# Patient Record
Sex: MALE | Race: ASIAN | NOT HISPANIC OR LATINO
[De-identification: names, ages, dates, MRNs, and addresses within clinical notes are randomized per-mention and may not be internally consistent; named-entity substitution may affect disease eponyms.]

---

## 2022-12-12 ENCOUNTER — APPOINTMENT (OUTPATIENT)
Dept: UROLOGY | Facility: CLINIC | Age: 72
End: 2022-12-12

## 2022-12-12 DIAGNOSIS — Z78.9 OTHER SPECIFIED HEALTH STATUS: ICD-10-CM

## 2022-12-12 PROCEDURE — 99204 OFFICE O/P NEW MOD 45 MIN: CPT

## 2022-12-12 NOTE — LETTER BODY
[FreeTextEntry1] : Torito Rodriguez MD\par [address] [facility]\[place] [address]\par [phone]\par \par Dear Dr. Rodriguez, \par \par Reason for visit: Abnormal imaging. Possible bilateral stones.\par \par This is a 72 year old male with abnormal abdominal imaging. Patient underwent an ultrasound, which demonstrated possible bilateral stones. Patient denies any flank pain, hematuria, or urinary difficulties. Patient denies any urinary incontinence. The patient denies any aggravating or relieving factors. The patient denies any interference of function. The patient is entirely asymptomatic. All other review of systems are negative. He has no cancer in his family medical history. He has no previous surgical history. Past medical history, family history and social history were inquired and were noncontributory to current condition. The patient does not use tobacco or drink alcohol. Medications and allergies were reviewed. He has no known allergies to medication. \par \par On examination, the patient is a healthy-appearing gentleman in no acute distress. He is alert and oriented follows commands. He has normal mood and affect. He is normocephalic. Neck is supple. Respirations are unlabored. His abdomen is soft and nontender. Bladder is nonpalpable. No CVA tenderness. Neurologically he is grossly intact. No peripheral edema. Skin without gross abnormality. He has normal male external genitalia. Normal meatus. Bilateral testes are descended intrascrotally and normal to palpation. On rectal examination, there is normal sphincter tone. The prostate is clinically benign without focal induration or nodularity.\par \par I personally reviewed ultrasound images with the patient today and images demonstrated a left 1.7 cm ureteral stone with hydronephrosis. Patient also has a right kidney stone. \par \par Assessment: Abnormal urinary imaging. RIght kidney stone. Left ureteral stone. Possible left hydronephrosis. Bilateral stones.\par \par I counseled the patient. I recommend he obtained CT scan to evaluate for hydronephrosis.  I counseled the patient regarding the procedure. The risks and benefits were discussed. Alternatives were given. I answered the patient questions. The patient will take the necessary preparations for the procedure. The patient will follow-up as directed and will contact me with any questions or concerns. \par \par Plan: CT scan. Follow up as directed.

## 2022-12-13 ENCOUNTER — RESULT REVIEW (OUTPATIENT)
Age: 72
End: 2022-12-13

## 2022-12-13 LAB
ANION GAP SERPL CALC-SCNC: 13 MMOL/L
BASOPHILS # BLD AUTO: 0.05 K/UL
BASOPHILS NFR BLD AUTO: 0.9 %
BUN SERPL-MCNC: 15 MG/DL
CALCIUM SERPL-MCNC: 9.3 MG/DL
CHLORIDE SERPL-SCNC: 101 MMOL/L
CO2 SERPL-SCNC: 26 MMOL/L
CREAT SERPL-MCNC: 1.05 MG/DL
EGFR: 75 ML/MIN/1.73M2
EOSINOPHIL # BLD AUTO: 0.34 K/UL
EOSINOPHIL NFR BLD AUTO: 6 %
GLUCOSE SERPL-MCNC: 139 MG/DL
HCT VFR BLD CALC: 47.4 %
HGB BLD-MCNC: 15.4 G/DL
IMM GRANULOCYTES NFR BLD AUTO: 0.5 %
LYMPHOCYTES # BLD AUTO: 1.74 K/UL
LYMPHOCYTES NFR BLD AUTO: 30.9 %
MAN DIFF?: NORMAL
MCHC RBC-ENTMCNC: 30.7 PG
MCHC RBC-ENTMCNC: 32.5 GM/DL
MCV RBC AUTO: 94.4 FL
MONOCYTES # BLD AUTO: 0.39 K/UL
MONOCYTES NFR BLD AUTO: 6.9 %
NEUTROPHILS # BLD AUTO: 3.09 K/UL
NEUTROPHILS NFR BLD AUTO: 54.8 %
PLATELET # BLD AUTO: 316 K/UL
POTASSIUM SERPL-SCNC: 4.2 MMOL/L
PSA FREE FLD-MCNC: 13 %
PSA FREE SERPL-MCNC: 0.27 NG/ML
PSA SERPL-MCNC: 2.1 NG/ML
RBC # BLD: 5.02 M/UL
RBC # FLD: 13.1 %
SODIUM SERPL-SCNC: 141 MMOL/L
WBC # FLD AUTO: 5.64 K/UL

## 2022-12-14 ENCOUNTER — OUTPATIENT (OUTPATIENT)
Dept: OUTPATIENT SERVICES | Facility: HOSPITAL | Age: 72
LOS: 1 days | End: 2022-12-14
Payer: COMMERCIAL

## 2022-12-14 ENCOUNTER — APPOINTMENT (OUTPATIENT)
Dept: CT IMAGING | Facility: CLINIC | Age: 72
End: 2022-12-14

## 2022-12-14 DIAGNOSIS — R31.0 GROSS HEMATURIA: ICD-10-CM

## 2022-12-14 DIAGNOSIS — N20.1 CALCULUS OF URETER: ICD-10-CM

## 2022-12-14 PROCEDURE — 74176 CT ABD & PELVIS W/O CONTRAST: CPT | Mod: 26

## 2022-12-19 ENCOUNTER — APPOINTMENT (OUTPATIENT)
Dept: UROLOGY | Facility: CLINIC | Age: 72
End: 2022-12-19

## 2022-12-19 PROCEDURE — 99214 OFFICE O/P EST MOD 30 MIN: CPT

## 2022-12-19 NOTE — LETTER BODY
[FreeTextEntry1] : Torito Rodriguez MD\par [address] [facility]\[place] [address]\par [phone]\par \par Dear Dr. Rodriguez, \par \par Reason for visit: Large left ureteral stone.\par \par This is a 72 year old male with abnormal abdominal imaging. Patient previously underwent an ultrasound, which demonstrated possible bilateral stones. Patient denies any flank pain, hematuria, or urinary difficulties. Patient denies any urinary incontinence. The patient denies any aggravating or relieving factors. The patient denies any interference of function. The patient is entirely asymptomatic. All other review of systems are negative. He has no cancer in his family medical history. He has no previous surgical history. Past medical history, family history and social history were inquired and were noncontributory to current condition. The patient does not use tobacco or drink alcohol. Medications and allergies were reviewed. He has no known allergies to medication. \par \par On examination, the patient is a healthy-appearing gentleman in no acute distress. He is alert and oriented follows commands. He has normal mood and affect. He is normocephalic. Neck is supple. Respirations are unlabored. His abdomen is soft and nontender. Bladder is nonpalpable. No CVA tenderness. Neurologically he is grossly intact. No peripheral edema. Skin without gross abnormality. He has normal male external genitalia. Normal meatus. Bilateral testes are descended intrascrotally and normal to palpation. On rectal examination, there is normal sphincter tone. The prostate is clinically benign without focal induration or nodularity.\par \par Assessment: Abnormal urinary imaging. RIght kidney stone. Left ureteral stone. Possible left hydronephrosis. Bilateral stones.\par \par I counseled the patient. I recommend he obtain left uteroscopy for his left ureteral stone. I counseled the patient regarding the procedure. The risks and benefits were discussed. Alternatives were given. I answered the patient questions. The patient will take the necessary preparations for the procedure. The patient will follow-up as directed and will contact me with any questions or concerns. \par \par Plan: Left uteroscopy. BMP. Urine culture. Follow up as directed.

## 2022-12-19 NOTE — ADDENDUM
[FreeTextEntry1] : Entered by Rylie Lipscomb, acting as scribe for Dr. Onel Bowie.\par The documentation recorded by the scribe accurately reflects the service I personally performed and the decisions made by me.

## 2022-12-20 LAB — BACTERIA UR CULT: NORMAL

## 2022-12-21 ENCOUNTER — OUTPATIENT (OUTPATIENT)
Dept: OUTPATIENT SERVICES | Facility: HOSPITAL | Age: 72
LOS: 1 days | End: 2022-12-21
Payer: COMMERCIAL

## 2022-12-21 VITALS
HEIGHT: 67 IN | SYSTOLIC BLOOD PRESSURE: 151 MMHG | WEIGHT: 147.93 LBS | DIASTOLIC BLOOD PRESSURE: 84 MMHG | HEART RATE: 77 BPM | OXYGEN SATURATION: 99 % | RESPIRATION RATE: 14 BRPM | TEMPERATURE: 98 F

## 2022-12-21 DIAGNOSIS — Z00.00 ENCOUNTER FOR GENERAL ADULT MEDICAL EXAMINATION WITHOUT ABNORMAL FINDINGS: ICD-10-CM

## 2022-12-21 DIAGNOSIS — R31.0 GROSS HEMATURIA: ICD-10-CM

## 2022-12-21 DIAGNOSIS — N20.1 CALCULUS OF URETER: ICD-10-CM

## 2022-12-21 DIAGNOSIS — Z01.818 ENCOUNTER FOR OTHER PREPROCEDURAL EXAMINATION: ICD-10-CM

## 2022-12-21 DIAGNOSIS — Z98.890 OTHER SPECIFIED POSTPROCEDURAL STATES: Chronic | ICD-10-CM

## 2022-12-21 LAB
ANION GAP SERPL CALC-SCNC: 13 MMOL/L — SIGNIFICANT CHANGE UP (ref 5–17)
BUN SERPL-MCNC: 17 MG/DL — SIGNIFICANT CHANGE UP (ref 7–23)
CALCIUM SERPL-MCNC: 9.3 MG/DL — SIGNIFICANT CHANGE UP (ref 8.4–10.5)
CHLORIDE SERPL-SCNC: 101 MMOL/L — SIGNIFICANT CHANGE UP (ref 96–108)
CO2 SERPL-SCNC: 27 MMOL/L — SIGNIFICANT CHANGE UP (ref 22–31)
CREAT SERPL-MCNC: 1.07 MG/DL — SIGNIFICANT CHANGE UP (ref 0.5–1.3)
EGFR: 73 ML/MIN/1.73M2 — SIGNIFICANT CHANGE UP
GLUCOSE SERPL-MCNC: 96 MG/DL — SIGNIFICANT CHANGE UP (ref 70–99)
HCT VFR BLD CALC: 48.6 % — SIGNIFICANT CHANGE UP (ref 39–50)
HGB BLD-MCNC: 15.8 G/DL — SIGNIFICANT CHANGE UP (ref 13–17)
MCHC RBC-ENTMCNC: 30.6 PG — SIGNIFICANT CHANGE UP (ref 27–34)
MCHC RBC-ENTMCNC: 32.5 GM/DL — SIGNIFICANT CHANGE UP (ref 32–36)
MCV RBC AUTO: 94.2 FL — SIGNIFICANT CHANGE UP (ref 80–100)
NRBC # BLD: 0 /100 WBCS — SIGNIFICANT CHANGE UP (ref 0–0)
PLATELET # BLD AUTO: 308 K/UL — SIGNIFICANT CHANGE UP (ref 150–400)
POTASSIUM SERPL-MCNC: 4.3 MMOL/L — SIGNIFICANT CHANGE UP (ref 3.5–5.3)
POTASSIUM SERPL-SCNC: 4.3 MMOL/L — SIGNIFICANT CHANGE UP (ref 3.5–5.3)
RBC # BLD: 5.16 M/UL — SIGNIFICANT CHANGE UP (ref 4.2–5.8)
RBC # FLD: 12.7 % — SIGNIFICANT CHANGE UP (ref 10.3–14.5)
SODIUM SERPL-SCNC: 141 MMOL/L — SIGNIFICANT CHANGE UP (ref 135–145)
WBC # BLD: 5.97 K/UL — SIGNIFICANT CHANGE UP (ref 3.8–10.5)
WBC # FLD AUTO: 5.97 K/UL — SIGNIFICANT CHANGE UP (ref 3.8–10.5)

## 2022-12-21 PROCEDURE — 80048 BASIC METABOLIC PNL TOTAL CA: CPT

## 2022-12-21 PROCEDURE — G0463: CPT

## 2022-12-21 PROCEDURE — 36415 COLL VENOUS BLD VENIPUNCTURE: CPT

## 2022-12-21 PROCEDURE — 85027 COMPLETE CBC AUTOMATED: CPT

## 2022-12-21 NOTE — H&P PST ADULT - HISTORY OF PRESENT ILLNESS
DASI-   Denies COVID infection in past 90 days  COVID swab scheduled at [facility] 12/31/22 73 y/o male with no significant PMH/PSH- right kidney stone extraction (approximately 1970) presents today for presurgical testing.  He is scheduled for left ureteroscopy; laser lithotripsy; stone extraction; stent placement on 1/4/23 with Onel Tee.  He denies pain, infection, urinary frequency or hesitancy.  He states that he has had blood in his urine.  CT renal stone hunt 12/14/22 showed numerous left sided kidney stones and mild hydronephrosis; also right kidney stones without hydronephrosis.  He denies fever, chills, malaise, fatigue, n/v, diarrhea, abdominal pain, sore throat, headaches, nasal congestion, rhinnorhea, ear pain, cough, SOB, chest pain, palpitations, and change in taste/smell.       DASI- 7.99 (denies chest pain/SOB)  Denies COVID infection in past 90 days  COVID swab scheduled at [facility] 12/31/22  Advised bring case for glasses DOS  Advised bring denture cup DOS; he is aware must remove dentures prior to OR

## 2022-12-21 NOTE — H&P PST ADULT - PROBLEM SELECTOR PLAN 2
- scheduled for left ureteroscopy; laser lithotripsy; stone extraction; stent placement on 1/4/23 with Onel Tee  -CBC, BMP today in PST  -Urine Culture (Dr. Bowie 12/19/22)  -COVID swab scheduled for  [place]- 12/31/22  -Preop instructions provided; Patient stated understanding using teach back method; copy of written instructions provided  -Adequate time provided for questions and answers   -No medications by mouth am DOS, prior to leaving home; not presently on medications  -Antibiotics ordered DOS per protocol

## 2022-12-21 NOTE — H&P PST ADULT - ATTENDING COMMENTS
Patient with left renal and ureteral calculi. Patient wishes to proceed with left ureteroscopy, laser lithotripsy, stone extraction, stent placement. Informed consent was obtained. Alternatives were given. Risks including but not limited to bleeding, infection, risk of anesthesia, pain, failure to cure, negative ureteroscopy, stone migration, need for future procedure, and injury to surrounding structures were discussed. Patient wishes to proceed with procedure.

## 2022-12-21 NOTE — H&P PST ADULT - NEGATIVE GENERAL GENITOURINARY SYMPTOMS
no renal colic/no flank pain L/no flank pain R/no bladder infections/no incontinence/no dysuria/no urinary hesitancy/normal urinary frequency/no nocturia

## 2022-12-21 NOTE — H&P PST ADULT - NSICDXPASTSURGICALHX_GEN_ALL_CORE_FT
Christian PAST SURGICAL HISTORY:  H/O lithotripsy     History of ear, nose, and throat (ENT) surgery

## 2022-12-23 PROBLEM — N20.0 CALCULUS OF KIDNEY: Chronic | Status: ACTIVE | Noted: 2022-12-21

## 2022-12-31 LAB — SARS-COV-2 N GENE NPH QL NAA+PROBE: NOT DETECTED

## 2023-01-03 ENCOUNTER — TRANSCRIPTION ENCOUNTER (OUTPATIENT)
Age: 73
End: 2023-01-03

## 2023-01-04 ENCOUNTER — APPOINTMENT (OUTPATIENT)
Dept: UROLOGY | Facility: HOSPITAL | Age: 73
End: 2023-01-04

## 2023-01-04 ENCOUNTER — TRANSCRIPTION ENCOUNTER (OUTPATIENT)
Age: 73
End: 2023-01-04

## 2023-01-04 ENCOUNTER — RESULT REVIEW (OUTPATIENT)
Age: 73
End: 2023-01-04

## 2023-01-04 ENCOUNTER — OUTPATIENT (OUTPATIENT)
Dept: OUTPATIENT SERVICES | Facility: HOSPITAL | Age: 73
LOS: 1 days | End: 2023-01-04
Payer: COMMERCIAL

## 2023-01-04 VITALS
WEIGHT: 147.93 LBS | HEIGHT: 67 IN | RESPIRATION RATE: 17 BRPM | HEART RATE: 76 BPM | SYSTOLIC BLOOD PRESSURE: 155 MMHG | OXYGEN SATURATION: 97 % | TEMPERATURE: 98 F | DIASTOLIC BLOOD PRESSURE: 78 MMHG

## 2023-01-04 VITALS
OXYGEN SATURATION: 98 % | HEART RATE: 73 BPM | DIASTOLIC BLOOD PRESSURE: 85 MMHG | SYSTOLIC BLOOD PRESSURE: 164 MMHG | RESPIRATION RATE: 16 BRPM

## 2023-01-04 DIAGNOSIS — Z98.890 OTHER SPECIFIED POSTPROCEDURAL STATES: Chronic | ICD-10-CM

## 2023-01-04 DIAGNOSIS — R31.0 GROSS HEMATURIA: ICD-10-CM

## 2023-01-04 DIAGNOSIS — N20.1 CALCULUS OF URETER: ICD-10-CM

## 2023-01-04 DIAGNOSIS — Z00.00 ENCOUNTER FOR GENERAL ADULT MEDICAL EXAMINATION WITHOUT ABNORMAL FINDINGS: ICD-10-CM

## 2023-01-04 PROCEDURE — 74420 UROGRAPHY RTRGR +-KUB: CPT | Mod: 26

## 2023-01-04 PROCEDURE — 52356 CYSTO/URETERO W/LITHOTRIPSY: CPT | Mod: 22,LT

## 2023-01-04 PROCEDURE — 88300 SURGICAL PATH GROSS: CPT | Mod: 26

## 2023-01-04 PROCEDURE — 76000 FLUOROSCOPY <1 HR PHYS/QHP: CPT

## 2023-01-04 PROCEDURE — 74176 CT ABD & PELVIS W/O CONTRAST: CPT

## 2023-01-04 DEVICE — STENT URET INLAY OPTIMA 6FRX26CM: Type: IMPLANTABLE DEVICE | Site: LEFT | Status: FUNCTIONAL

## 2023-01-04 DEVICE — URETERAL CATH OPEN END 5FR 70CM: Type: IMPLANTABLE DEVICE | Site: LEFT | Status: FUNCTIONAL

## 2023-01-04 DEVICE — LASER FIBER SOLTIVE 200 BALL TIP: Type: IMPLANTABLE DEVICE | Site: LEFT | Status: FUNCTIONAL

## 2023-01-04 DEVICE — URETERAL CATH DUAL LUMEN 10FR 54CM: Type: IMPLANTABLE DEVICE | Site: LEFT | Status: FUNCTIONAL

## 2023-01-04 DEVICE — URETERAL SHEATH NAVIGATOR HD 11/13FR X 46CM: Type: IMPLANTABLE DEVICE | Site: LEFT | Status: FUNCTIONAL

## 2023-01-04 DEVICE — GUIDEWIRE SENSOR DUAL-FLEX NITINOL STRAIGHT .035" X 150CM: Type: IMPLANTABLE DEVICE | Site: LEFT | Status: FUNCTIONAL

## 2023-01-04 DEVICE — STONE BASKET ZEROTIP NITINOL 4-WIRE 1.9FR 120CM X 12MM: Type: IMPLANTABLE DEVICE | Site: LEFT | Status: FUNCTIONAL

## 2023-01-04 RX ORDER — SODIUM CHLORIDE 9 MG/ML
1000 INJECTION, SOLUTION INTRAVENOUS
Refills: 0 | Status: DISCONTINUED | OUTPATIENT
Start: 2023-01-04 | End: 2023-01-04

## 2023-01-04 RX ORDER — CEFAZOLIN SODIUM 1 G
2000 VIAL (EA) INJECTION ONCE
Refills: 0 | Status: COMPLETED | OUTPATIENT
Start: 2023-01-04 | End: 2023-01-04

## 2023-01-04 RX ORDER — SODIUM CHLORIDE 9 MG/ML
3 INJECTION INTRAMUSCULAR; INTRAVENOUS; SUBCUTANEOUS EVERY 8 HOURS
Refills: 0 | Status: DISCONTINUED | OUTPATIENT
Start: 2023-01-04 | End: 2023-01-04

## 2023-01-04 RX ORDER — FINASTERIDE 5 MG/1
1 TABLET, FILM COATED ORAL
Qty: 30 | Refills: 0
Start: 2023-01-04 | End: 2023-02-02

## 2023-01-04 RX ORDER — TAMSULOSIN HYDROCHLORIDE 0.4 MG/1
1 CAPSULE ORAL
Qty: 30 | Refills: 0
Start: 2023-01-04 | End: 2023-02-02

## 2023-01-04 RX ORDER — CEPHALEXIN 500 MG
1 CAPSULE ORAL
Qty: 6 | Refills: 0
Start: 2023-01-04 | End: 2023-01-06

## 2023-01-04 RX ORDER — LIDOCAINE HCL 20 MG/ML
0.2 VIAL (ML) INJECTION ONCE
Refills: 0 | Status: DISCONTINUED | OUTPATIENT
Start: 2023-01-04 | End: 2023-01-04

## 2023-01-04 RX ORDER — HYDROMORPHONE HYDROCHLORIDE 2 MG/ML
0.5 INJECTION INTRAMUSCULAR; INTRAVENOUS; SUBCUTANEOUS
Refills: 0 | Status: DISCONTINUED | OUTPATIENT
Start: 2023-01-04 | End: 2023-01-04

## 2023-01-04 RX ORDER — SODIUM CHLORIDE 9 MG/ML
1000 INJECTION, SOLUTION INTRAVENOUS
Refills: 0 | Status: DISCONTINUED | OUTPATIENT
Start: 2023-01-04 | End: 2023-01-18

## 2023-01-04 NOTE — ASU DISCHARGE PLAN (ADULT/PEDIATRIC) - NS MD DC FALL RISK RISK
For information on Fall & Injury Prevention, visit: [email or web address] OR  [email or web address] OR  https://www.cdc.gov/steadi/patient.html

## 2023-01-04 NOTE — ASU DISCHARGE PLAN (ADULT/PEDIATRIC) - ASU DC SPECIAL INSTRUCTIONSFT
Call the office if you have fever greater than 101, difficulty urinating, pain not relieved with pain medication, nausea/vomiting.    Follow up with Dr. Bowie in 2 weeks for KUB  May take over the counter pain medication as needed for pain Call the office if you have fever greater than 101, difficulty urinating, pain not relieved with pain medication, nausea/vomiting.    follow up on friday 1/6 for fowler removal.  Follow up with Dr. Bowie in 2 weeks for KUB  May take over the counter pain medication as needed for pain

## 2023-01-04 NOTE — PATIENT PROFILE ADULT - PATIENT'S SEXUAL ORIENTATION
INFECTION - PEDIATRIC    • Absence of infection during hospitalization Progressing        PAIN - PEDIATRIC    • Verbalizes/displays adequate comfort level or patient's stated pain goal Progressing        Patient/Family Goals    • Patient/Family Long Term G
Pt's VSS over night. Afebrile. Pt on room air, NAD noted. Lung sounds clear and equal.  No WOB noted. Minimal/scant crepitus noted to left side. Left chest/abd drsg CDI. NSR on monitor. Strong pulses and perfusion.   Pt blaire reg diet with good PO [place]
Heterosexual

## 2023-01-04 NOTE — ASU DISCHARGE PLAN (ADULT/PEDIATRIC) - BATHING
No change Provider Procedure Text (A): After obtaining clear surgical margins the defect was repaired by another provider.

## 2023-01-04 NOTE — PROCEDURE NOTE - ADDITIONAL PROCEDURE DETAILS
Patient uncomfortable, unable to void. Retaining about 900cc. Called to place fowler for retention. 18F coude placed using sterile technique. 700cc clear colored urine drained. Patient tolerated procedure well. Patient to go home with leg to fowler bag and follow up with Dr. Bowie in 2 days for removal.

## 2023-01-05 ENCOUNTER — APPOINTMENT (OUTPATIENT)
Dept: UROLOGY | Facility: CLINIC | Age: 73
End: 2023-01-05

## 2023-01-05 PROCEDURE — C1766: CPT

## 2023-01-05 PROCEDURE — C1889: CPT

## 2023-01-05 PROCEDURE — C1769: CPT

## 2023-01-05 PROCEDURE — C1758: CPT

## 2023-01-05 PROCEDURE — 88300 SURGICAL PATH GROSS: CPT

## 2023-01-05 PROCEDURE — C2617: CPT

## 2023-01-05 PROCEDURE — 82365 CALCULUS SPECTROSCOPY: CPT

## 2023-01-05 PROCEDURE — 52356 CYSTO/URETERO W/LITHOTRIPSY: CPT | Mod: LT

## 2023-01-05 PROCEDURE — 76000 FLUOROSCOPY <1 HR PHYS/QHP: CPT

## 2023-01-06 ENCOUNTER — APPOINTMENT (OUTPATIENT)
Dept: UROLOGY | Facility: CLINIC | Age: 73
End: 2023-01-06
Payer: MEDICARE

## 2023-01-06 VITALS
TEMPERATURE: 97.9 F | RESPIRATION RATE: 16 BRPM | OXYGEN SATURATION: 96 % | SYSTOLIC BLOOD PRESSURE: 120 MMHG | DIASTOLIC BLOOD PRESSURE: 69 MMHG | HEART RATE: 107 BPM

## 2023-01-06 PROCEDURE — 51798 US URINE CAPACITY MEASURE: CPT

## 2023-01-06 PROCEDURE — A4216: CPT | Mod: NC

## 2023-01-06 PROCEDURE — 51700 IRRIGATION OF BLADDER: CPT

## 2023-01-06 PROCEDURE — 99213 OFFICE O/P EST LOW 20 MIN: CPT | Mod: 25

## 2023-01-06 NOTE — LETTER BODY
[FreeTextEntry1] : Torito Rodriguez MD\par [address] [facility]\[place] [address]\par [phone]\par \par Dear Dr. Rodriguez, \par \par Reason for visit: RIght kidney stone. Left ureteral stone, s/p left ureteroscopy. Bilateral stones.\par \par This is a 72 year old male with abnormal abdominal imaging. Patient previously underwent an ultrasound, which demonstrated possible bilateral stones. The patient returns today for voiding trial. Since he was last seen, the patient underwent left ureteroscopy without any difficulties. Patient denies any flank pain, hematuria, or urinary difficulties. Patient denies any urinary incontinence. The patient denies any aggravating or relieving factors. The patient denies any interference of function. The patient is entirely asymptomatic. All other review of systems are negative. He has no cancer in his family medical history. He has no previous surgical history. Past medical history, family history and social history were inquired and were noncontributory to current condition. The patient does not use tobacco or drink alcohol. Medications and allergies were reviewed. He has no known allergies to medication. \par \par On examination, the patient is a healthy-appearing gentleman in no acute distress. He is alert and oriented follows commands. He has normal mood and affect. He is normocephalic. Neck is supple. Respirations are unlabored. His abdomen is soft and nontender. Bladder is nonpalpable. No CVA tenderness. Neurologically he is grossly intact. No peripheral edema. Skin without gross abnormality. He has normal male external genitalia. Normal meatus. Bilateral testes are descended intrascrotally and normal to palpation. On rectal examination, there is normal sphincter tone. The prostate is clinically benign without focal induration or nodularity.\par \par Patient was given a voiding trial today. The patient's bladder was filled with water. Patient was able to void spontaneously.\par \par Post-void residual on bladder scan today was 0 cc.\par \par Assessment: Abnormal urinary imaging. RIght kidney stone. Left ureteral stone. Possible left hydronephrosis. Bilateral stones.\par \par I counseled the patient. In terms of his left ureteral stone and left hydronephrosis, the patient passed his voiding trial today. I recommended he follow up in 2 weeks for stent removal. Risks and alternatives were discussed. I answered the patient questions. The patient will follow-up as directed and will contact me with any questions or concerns. Thank you for the opportunity to participate in the care of this patient, I will keep you updated on his progress. \par \par Plan: Follow up in 2 weeks for left stent removal.

## 2023-01-06 NOTE — ADDENDUM
[FreeTextEntry1] : Entered by ANN PIEDRA, acting as scribe for Dr. Onel Bowie.\par The documentation recorded by the scribe accurately reflects the service I personally performed and the decisions made by me.

## 2023-01-08 LAB — SURGICAL PATHOLOGY STUDY: SIGNIFICANT CHANGE UP

## 2023-01-12 LAB — NIDUS STONE QN: SIGNIFICANT CHANGE UP

## 2023-01-17 ENCOUNTER — OUTPATIENT (OUTPATIENT)
Dept: OUTPATIENT SERVICES | Facility: HOSPITAL | Age: 73
LOS: 1 days | End: 2023-01-17
Payer: COMMERCIAL

## 2023-01-17 ENCOUNTER — APPOINTMENT (OUTPATIENT)
Dept: RADIOLOGY | Facility: CLINIC | Age: 73
End: 2023-01-17
Payer: MEDICARE

## 2023-01-17 DIAGNOSIS — Z98.890 OTHER SPECIFIED POSTPROCEDURAL STATES: Chronic | ICD-10-CM

## 2023-01-17 DIAGNOSIS — Z00.00 ENCOUNTER FOR GENERAL ADULT MEDICAL EXAMINATION WITHOUT ABNORMAL FINDINGS: ICD-10-CM

## 2023-01-17 PROCEDURE — 74018 RADEX ABDOMEN 1 VIEW: CPT | Mod: 26

## 2023-01-17 PROCEDURE — 74018 RADEX ABDOMEN 1 VIEW: CPT

## 2023-01-19 ENCOUNTER — APPOINTMENT (OUTPATIENT)
Dept: UROLOGY | Facility: CLINIC | Age: 73
End: 2023-01-19

## 2023-01-19 ENCOUNTER — APPOINTMENT (OUTPATIENT)
Dept: UROLOGY | Facility: CLINIC | Age: 73
End: 2023-01-19
Payer: MEDICARE

## 2023-01-19 PROCEDURE — 99213 OFFICE O/P EST LOW 20 MIN: CPT

## 2023-01-19 NOTE — LETTER BODY
[FreeTextEntry1] : Torito Rodriguez MD\par [address] [facility]\[place] [address]\par [phone]\par \par Dear Dr. Rodriguez, \par \par Reason for visit: RIght kidney stone. Left ureteral stone, s/p left ureteroscopy. Bilateral stones.\par \par This is a 72 year old male with kidney stones. Patient previously underwent an ultrasound, which demonstrated possible bilateral stones. The patient underwent left ureteroscopy 2 weeks ago without any difficulties. He had 5 large stones in the left kidney. Patient denies any flank pain, hematuria, or urinary difficulties. Patient denies any urinary incontinence. The patient denies any aggravating or relieving factors. The patient denies any interference of function. The patient is entirely asymptomatic. All other review of systems are negative. He has no cancer in his family medical history. He has no previous surgical history. Past medical history, family history and social history were inquired and were noncontributory to current condition. The patient does not use tobacco or drink alcohol. Medications and allergies were reviewed. He has no known allergies to medication. \par \par On examination, the patient is a healthy-appearing gentleman in no acute distress. He is alert and oriented follows commands. He has normal mood and affect. He is normocephalic. Neck is supple. Respirations are unlabored. His abdomen is soft and nontender. Bladder is nonpalpable. No CVA tenderness. Neurologically he is grossly intact. No peripheral edema. Skin without gross abnormality. He has normal male external genitalia. Normal meatus. Bilateral testes are descended intrascrotally and normal to palpation. On rectal examination, there is normal sphincter tone. The prostate is clinically benign without focal induration or nodularity.\par \par His KIB demonstrated residual fragments in the left lower pole.\par \par Assessment: Abnormal urinary imaging. RIght kidney stone. Left ureteral stone. Possible left hydronephrosis. Bilateral stones.\par \par I counseled the patient. In terms of his residual fragments I recommended that he continue hydration and try the Trendelenburg position as directed and as tolerated to help facilitate passage of residual fragments in the left kidney. Risks and alternatives were discussed. I answered the patient questions. The patient will follow-up as directed and will contact me with any questions or concerns. Thank you for the opportunity to participate in the care of this patient, I will keep you updated on his progress. \par \par Plan: Trendelenburg position. Hydration. Follow up as directed. HERMELINDO in 2 weeks

## 2023-02-09 ENCOUNTER — APPOINTMENT (OUTPATIENT)
Dept: UROLOGY | Facility: CLINIC | Age: 73
End: 2023-02-09

## 2023-02-28 ENCOUNTER — OUTPATIENT (OUTPATIENT)
Dept: OUTPATIENT SERVICES | Facility: HOSPITAL | Age: 73
LOS: 1 days | End: 2023-02-28

## 2023-02-28 DIAGNOSIS — Z98.890 OTHER SPECIFIED POSTPROCEDURAL STATES: Chronic | ICD-10-CM

## 2023-02-28 DIAGNOSIS — R31.0 GROSS HEMATURIA: ICD-10-CM

## 2023-02-28 DIAGNOSIS — N20.1 CALCULUS OF URETER: ICD-10-CM

## 2023-03-01 ENCOUNTER — OUTPATIENT (OUTPATIENT)
Dept: OUTPATIENT SERVICES | Facility: HOSPITAL | Age: 73
LOS: 1 days | End: 2023-03-01
Payer: COMMERCIAL

## 2023-03-01 ENCOUNTER — APPOINTMENT (OUTPATIENT)
Dept: RADIOLOGY | Facility: CLINIC | Age: 73
End: 2023-03-01
Payer: MEDICARE

## 2023-03-01 DIAGNOSIS — Z98.890 OTHER SPECIFIED POSTPROCEDURAL STATES: Chronic | ICD-10-CM

## 2023-03-01 DIAGNOSIS — N20.1 CALCULUS OF URETER: ICD-10-CM

## 2023-03-01 DIAGNOSIS — R31.0 GROSS HEMATURIA: ICD-10-CM

## 2023-03-01 PROCEDURE — 74018 RADEX ABDOMEN 1 VIEW: CPT

## 2023-03-01 PROCEDURE — 74018 RADEX ABDOMEN 1 VIEW: CPT | Mod: 26

## 2023-03-06 ENCOUNTER — APPOINTMENT (OUTPATIENT)
Dept: UROLOGY | Facility: CLINIC | Age: 73
End: 2023-03-06
Payer: MEDICARE

## 2023-03-06 ENCOUNTER — OUTPATIENT (OUTPATIENT)
Dept: OUTPATIENT SERVICES | Facility: HOSPITAL | Age: 73
LOS: 1 days | End: 2023-03-06
Payer: COMMERCIAL

## 2023-03-06 DIAGNOSIS — Z98.890 OTHER SPECIFIED POSTPROCEDURAL STATES: Chronic | ICD-10-CM

## 2023-03-06 DIAGNOSIS — N20.1 CALCULUS OF URETER: ICD-10-CM

## 2023-03-06 DIAGNOSIS — R35.0 FREQUENCY OF MICTURITION: ICD-10-CM

## 2023-03-06 DIAGNOSIS — R31.0 GROSS HEMATURIA: ICD-10-CM

## 2023-03-06 DIAGNOSIS — Z00.00 ENCOUNTER FOR GENERAL ADULT MEDICAL EXAMINATION W/OUT ABNORMAL FINDINGS: ICD-10-CM

## 2023-03-06 PROCEDURE — 52310 CYSTOSCOPY AND TREATMENT: CPT

## 2023-03-07 DIAGNOSIS — N20.1 CALCULUS OF URETER: ICD-10-CM

## 2023-03-07 DIAGNOSIS — Z00.00 ENCOUNTER FOR GENERAL ADULT MEDICAL EXAMINATION WITHOUT ABNORMAL FINDINGS: ICD-10-CM

## 2023-03-07 DIAGNOSIS — R31.0 GROSS HEMATURIA: ICD-10-CM

## 2023-11-09 NOTE — ADDENDUM
[FreeTextEntry1] : Entered by Rylie Lipscomb, acting as scribe for Dr. Onel Bowie.\par The documentation recorded by the scribe accurately reflects the service I personally performed and the decisions made by me.  Opioid Pregnancy And Lactation Text: These medications can lead to premature delivery and should be avoided during pregnancy. These medications are also present in breast milk in small amounts.

## 2024-06-10 NOTE — H&P PST ADULT - CARDIOVASCULAR
Neck , no lymphadenopathy details… normal/regular rate and rhythm/S1 S2 present/no gallops/no rub/no murmur
